# Patient Record
Sex: FEMALE | Race: WHITE | NOT HISPANIC OR LATINO | Employment: UNEMPLOYED | URBAN - METROPOLITAN AREA
[De-identification: names, ages, dates, MRNs, and addresses within clinical notes are randomized per-mention and may not be internally consistent; named-entity substitution may affect disease eponyms.]

---
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
## 2021-06-19 ENCOUNTER — HOSPITAL ENCOUNTER (EMERGENCY)
Facility: CLINIC | Age: 21
Discharge: HOME | End: 2021-06-20
Attending: EMERGENCY MEDICINE | Admitting: EMERGENCY MEDICINE
Payer: COMMERCIAL

## 2021-06-19 DIAGNOSIS — K08.89 PAIN, DENTAL: ICD-10-CM

## 2021-06-19 PROCEDURE — 99283 EMERGENCY DEPT VISIT LOW MDM: CPT | Mod: 25

## 2021-06-19 PROCEDURE — 64400 NJX AA&/STRD TRIGEMINAL NRV: CPT

## 2021-06-19 ASSESSMENT — MIFFLIN-ST. JEOR: SCORE: 1228.34

## 2021-06-20 VITALS
SYSTOLIC BLOOD PRESSURE: 129 MMHG | BODY MASS INDEX: 20.61 KG/M2 | DIASTOLIC BLOOD PRESSURE: 79 MMHG | OXYGEN SATURATION: 100 % | TEMPERATURE: 99.3 F | WEIGHT: 112 LBS | HEIGHT: 62 IN | HEART RATE: 78 BPM | RESPIRATION RATE: 18 BRPM

## 2021-06-20 PROCEDURE — 250N000013 HC RX MED GY IP 250 OP 250 PS 637: Performed by: EMERGENCY MEDICINE

## 2021-06-20 PROCEDURE — 250N000009 HC RX 250

## 2021-06-20 RX ORDER — BUPIVACAINE HYDROCHLORIDE AND EPINEPHRINE 5; 5 MG/ML; UG/ML
1.8 INJECTION, SOLUTION PERINEURAL ONCE
Status: COMPLETED | OUTPATIENT
Start: 2021-06-20 | End: 2021-06-20

## 2021-06-20 RX ORDER — BUPIVACAINE HYDROCHLORIDE AND EPINEPHRINE 5; 5 MG/ML; UG/ML
INJECTION, SOLUTION PERINEURAL
Status: COMPLETED
Start: 2021-06-20 | End: 2021-06-20

## 2021-06-20 RX ORDER — OXYCODONE HYDROCHLORIDE 5 MG/1
5 TABLET ORAL ONCE
Status: COMPLETED | OUTPATIENT
Start: 2021-06-20 | End: 2021-06-20

## 2021-06-20 RX ADMIN — OXYCODONE HYDROCHLORIDE 5 MG: 5 TABLET ORAL at 00:52

## 2021-06-20 RX ADMIN — BUPIVACAINE HYDROCHLORIDE AND EPINEPHRINE BITARTRATE 1.8 ML: 5; .005 INJECTION, SOLUTION SUBCUTANEOUS at 00:53

## 2021-06-20 RX ADMIN — BUPIVACAINE HYDROCHLORIDE AND EPINEPHRINE 1.8 ML: 5; 5 INJECTION, SOLUTION PERINEURAL at 00:53

## 2021-06-20 NOTE — ED TRIAGE NOTES
Pt having dental pain. States her right lower molar hurts and possible an upper right molar. Pt has had teeth problem in the past. Her pain started 2-3 days ago, states she started antibiotics 2 days ago. Pt also taking tylenol and ibuprofen with no relief. Pt A&Ox4

## 2021-06-20 NOTE — ED PROVIDER NOTES
"  History   Chief Complaint:  Dental Pain     HPI   David Osuna is a 20 year old female who presents with dental pain. For the past year and a half the patient has been dealing with intermittent severe pain in her back lower molar. She had her wisdom teeth removed which they thought would help but it did not provide any relief. Two to three days ago, the pain became severe, and she is unable to sleep due to this pain. She has tried ibuprofen and Tylenol which hasn't provided any relief. She states the pain has began radiating through the right side of her face. She notes that a small blister intromittently forms, and drains blood and pus, on her gums.     Review of Systems   HENT: Positive for dental problem.         Blister on gums   All other systems reviewed and are negative.    Allergies:  Tramadol    Medications:  Estradiol Cypionate  Norco  Amoxicillin    Past Medical History:    Anxiety  Recurrent major depressive disorder  ADHD  Oppositional defiant disorder  Tension headache    Past Surgical History:    Mass cyst abscess lymph node excision  [place] teeth    Family History:    Father: ADHD, Alcohol abuse, Drug abuse  Mother: Anxiety, Depression  Brother: ADHD, Anxiety, Depression  Mother: Asthma    Social History:  The patient was unaccompanied to the ED.    Physical Exam     Patient Vitals for the past 24 hrs:   BP Temp Temp src Pulse Resp SpO2 Height Weight   06/19/21 2245 124/88 99.3  F (37.4  C) Temporal 82 16 100 % 1.562 m (5' 1.5\") 50.8 kg (112 lb)       Physical Exam  SKIN:  Warm, dry.    HEMATOLOGIC/IMMUNOLOGIC/LYMPHATIC:  No cervical adenopathy.  HENT: No trismus.  No stridor.  Normal phonation.  No inflammation of the facial tissues.  No findings of oropharyngeal abscess.  EYES:  Conjunctivae normal.  CARDIOVASCULAR:  Regular rate and rhythm.  RESPIRATORY:  No respiratory distress, breath sounds equal and normal.  MUSCULOSKELETAL: Body habitus.  NEUROLOGIC:  Alert, conversant.  PSYCHIATRIC: " Anxious mood.    Emergency Department Course   Procedures    Dental Block      INDICATIONS: Dental pain    ANESTHESIA: Right inferior alveolar nerve block using 1.8mL 0.5% Bupivacaine with Epinephrine.     PATIENT STATUS: The patient tolerated the procedure well and there were no immediate complications.        Emergency Department Course:  Reviewed:  I reviewed nursing notes, vitals, past medical history and care everywhere    Assessments:  1548 I obtained history and examined the patient as noted above.     0018 I rechecked and updated the patient regarding the plan for care.    Interventions:  0052 Oxycodone 5mg PO    Disposition:  The patient was discharged to home.     Impression & Plan   Medical Decision Making:  David Osuna is a 20 year old female who presents with right facial/dental pain which has been an issue for some time. Just recently started Amoxicillin for concern of dental infection. No findings of oropharyngeal abscess on exam. No trismus. She has been taking Tylenol and ibuprofen with marginal relief. I performed an inferior alveolar nerve block which she thought was not helpful. She was given one dose of oxycodone, but I advised we do not prescribe opioids for dental pain. I provided an oral surgery follow up recommendation and otherwise encouraged follow up with her dentist of choice if need be.     Diagnosis:    ICD-10-CM    1. Pain, dental  K08.89      Scribe Disclosure:  I, Omar Dickerson, am serving as a scribe at 11:53 PM on 6/19/2021 to document services personally performed by Emanuel Vu MD based on my observations and the provider's statements to me.      Emanuel Vu MD  06/20/21 0254

## 2021-06-28 ENCOUNTER — APPOINTMENT (OUTPATIENT)
Dept: ULTRASOUND IMAGING | Facility: CLINIC | Age: 21
End: 2021-06-28
Attending: PHYSICIAN ASSISTANT
Payer: COMMERCIAL

## 2021-06-28 ENCOUNTER — HOSPITAL ENCOUNTER (EMERGENCY)
Facility: CLINIC | Age: 21
Discharge: HOME | End: 2021-06-28
Attending: PHYSICIAN ASSISTANT | Admitting: PHYSICIAN ASSISTANT
Payer: COMMERCIAL

## 2021-06-28 VITALS — TEMPERATURE: 98.4 F | HEART RATE: 123 BPM | OXYGEN SATURATION: 98 % | RESPIRATION RATE: 20 BRPM

## 2021-06-28 DIAGNOSIS — R55 SYNCOPE: ICD-10-CM

## 2021-06-28 DIAGNOSIS — N93.9 VAGINAL BLEEDING: ICD-10-CM

## 2021-06-28 LAB
ANION GAP SERPL CALCULATED.3IONS-SCNC: 6 MMOL/L (ref 3–14)
B-HCG FREE SERPL-ACNC: <5 IU/L
BASOPHILS # BLD AUTO: 0 10E9/L (ref 0–0.2)
BASOPHILS NFR BLD AUTO: 0.3 %
BUN SERPL-MCNC: 8 MG/DL (ref 7–30)
CALCIUM SERPL-MCNC: 9.8 MG/DL (ref 8.5–10.1)
CHLORIDE SERPL-SCNC: 105 MMOL/L (ref 96–110)
CO2 SERPL-SCNC: 26 MMOL/L (ref 20–32)
CREAT SERPL-MCNC: 0.76 MG/DL (ref 0.5–1)
DIFFERENTIAL METHOD BLD: NORMAL
EOSINOPHIL # BLD AUTO: 0.1 10E9/L (ref 0–0.7)
EOSINOPHIL NFR BLD AUTO: 0.7 %
ERYTHROCYTE [DISTWIDTH] IN BLOOD BY AUTOMATED COUNT: 11.7 % (ref 10–15)
GFR SERPL CREATININE-BSD FRML MDRD: >90 ML/MIN/{1.73_M2}
GLUCOSE SERPL-MCNC: 101 MG/DL (ref 70–99)
HCT VFR BLD AUTO: 39.3 % (ref 35–47)
HGB BLD-MCNC: 13.3 G/DL (ref 11.7–15.7)
IMM GRANULOCYTES # BLD: 0 10E9/L (ref 0–0.4)
IMM GRANULOCYTES NFR BLD: 0.3 %
INTERPRETATION ECG - MUSE: NORMAL
LYMPHOCYTES # BLD AUTO: 1.5 10E9/L (ref 0.8–5.3)
LYMPHOCYTES NFR BLD AUTO: 20 %
MCH RBC QN AUTO: 31.1 PG (ref 26.5–33)
MCHC RBC AUTO-ENTMCNC: 33.8 G/DL (ref 31.5–36.5)
MCV RBC AUTO: 92 FL (ref 78–100)
MONOCYTES # BLD AUTO: 0.4 10E9/L (ref 0–1.3)
MONOCYTES NFR BLD AUTO: 5.9 %
NEUTROPHILS # BLD AUTO: 5.5 10E9/L (ref 1.6–8.3)
NEUTROPHILS NFR BLD AUTO: 72.8 %
NRBC # BLD AUTO: 0 10*3/UL
NRBC BLD AUTO-RTO: 0 /100
PLATELET # BLD AUTO: 266 10E9/L (ref 150–450)
POTASSIUM SERPL-SCNC: 3.3 MMOL/L (ref 3.4–5.3)
RBC # BLD AUTO: 4.27 10E12/L (ref 3.8–5.2)
SODIUM SERPL-SCNC: 137 MMOL/L (ref 133–144)
SPECIMEN SOURCE: NORMAL
TROPONIN I SERPL-MCNC: <0.015 UG/L (ref 0–0.04)
WBC # BLD AUTO: 7.5 10E9/L (ref 4–11)
WET PREP SPEC: NORMAL

## 2021-06-28 PROCEDURE — 84484 ASSAY OF TROPONIN QUANT: CPT | Performed by: EMERGENCY MEDICINE

## 2021-06-28 PROCEDURE — 99285 EMERGENCY DEPT VISIT HI MDM: CPT | Mod: 25

## 2021-06-28 PROCEDURE — 80048 BASIC METABOLIC PNL TOTAL CA: CPT | Performed by: EMERGENCY MEDICINE

## 2021-06-28 PROCEDURE — 85025 COMPLETE CBC W/AUTO DIFF WBC: CPT | Performed by: EMERGENCY MEDICINE

## 2021-06-28 PROCEDURE — 84702 CHORIONIC GONADOTROPIN TEST: CPT

## 2021-06-28 PROCEDURE — 87210 SMEAR WET MOUNT SALINE/INK: CPT | Performed by: PHYSICIAN ASSISTANT

## 2021-06-28 PROCEDURE — 93005 ELECTROCARDIOGRAM TRACING: CPT

## 2021-06-28 PROCEDURE — 76830 TRANSVAGINAL US NON-OB: CPT

## 2021-06-28 RX ORDER — POTASSIUM CHLORIDE 1500 MG/1
40 TABLET, EXTENDED RELEASE ORAL ONCE
Status: DISCONTINUED | OUTPATIENT
Start: 2021-06-28 | End: 2021-06-28

## 2021-06-28 ASSESSMENT — ENCOUNTER SYMPTOMS
FEVER: 0
SHORTNESS OF BREATH: 0
HEADACHES: 0
DIARRHEA: 1
CHILLS: 0
NAUSEA: 1
SORE THROAT: 0
FATIGUE: 1

## 2021-06-28 NOTE — ED TRIAGE NOTES
Patient reports intermittent spotting for the past 2 days. States might be pregnant. Took home pregnancy middle of June. States fainted last night.

## 2021-06-29 NOTE — ED PROVIDER NOTES
History   Chief Complaint:  Vaginal Bleeding     The history is provided by the patient.      David Osuna is a 20 year old female with history of ADHD, anxiety, depressive disorder, and abscess excision who presents with vaginal bleeding. The patient states that for the past 2 weeks she has been spotting intermittently (which is abnormal from baseline as her menstrual cycle is usually regular), and notes that she also fainted unexpectedly last night. Here in the ED she endorses diarrhea last week which has since resolved, nausea yesterday, as well as concerns for pregnancy. She denies fever, chills, shortness of breath, chest pain, abdominal pain, vaginal discharge, dysuria, urinary urgency, urinary frequency,  headache, or sore throat. Of note her last menstrual cycle was approximately one month ago on 5/21 and she is not on any birth control. She has an appointment scheduled with her PCP in two days. Of note the patient recently finished a course of Amoxicillin for dental carries, with the last dose being 10 days ago on 6/18/21.     I asked the patient if she feels safe in her current relationship, to which she said yes and further denied any concern for STD's.     Review of Systems   Constitutional: Positive for fatigue. Negative for chills and fever.   HENT: Negative for sore throat.    Respiratory: Negative for shortness of breath.    Cardiovascular: Negative for chest pain.   Gastrointestinal: Positive for diarrhea (Resolved ) and nausea (Yesterday).   Genitourinary: Positive for vaginal bleeding.   Neurological: Positive for syncope (Yesterday). Negative for headaches.   All other systems reviewed and are negative.    Allergies:  Tramadol    Medications:  Depo-Estradiol  Cymbalta  Colace  Albuterol  Zyrtec  Flonase  Singulair  Micronor  Zofran    Past Medical History:    Severe Episodic Depressive Disorder  Anxiety  ADHD  Oppositional Defiant Disorder  Tension Headache  Nexplanon in Place  TMJ  Tenderness  Compression Fracture of T12  Seasonal Allergies  Acute Cystitis without Hematuria  Dental Caries    Past Surgical History:    Mass Cyst Abscess Node Excision    Family History:    Father - ADHD, Alcohol Abuse, Drug Abuse  Mother - Anxiety, Depression  Brother - ADHD, Anxiety, Depression    Social History:  Patient arrives alone, boyfriend arrived later on  Marital Status: Single    Physical Exam     Patient Vitals for the past 24 hrs:   Temp Temp src Pulse Resp SpO2   06/28/21 1624 98.4  F (36.9  C) Temporal 123 20 98 %       Physical Exam  Vitals signs and nursing note reviewed.   HENT:      Nose: Nose normal. No congestion or rhinorrhea.      Mouth/Throat:      Mouth: Mucous membranes are moist.      Pharynx: Oropharynx is clear. No oropharyngeal exudate or posterior oropharyngeal erythema.   Eyes:      General: No scleral icterus.     Extraocular Movements: Extraocular movements intact.      Conjunctiva/sclera: Conjunctivae normal.      Pupils: Pupils are equal, round, and reactive to light.   Cardiovascular:      Rate and Rhythm: Regular rhythm. Normal Rate.     Pulses: Normal pulses.      Heart sounds: Normal heart sounds.   Pulmonary:      Effort: Pulmonary effort is normal.      Breath sounds: Normal breath sounds.   Abdominal:      General: Abdomen is flat. Bowel sounds are normal.      Palpations: Abdomen is soft.      Tenderness: There is no abdominal tenderness.  exam: Chaperone present [employer]. Normal external genitalia.  No lesions or open areas.  Cervix was visualized and appeared normal.  Minimal blood noted in the vaginal canal.   Musculoskeletal: Normal range of motion.      Right lower leg: No edema.      Left lower leg: No edema.   Skin:     General: Skin is warm and dry.  No lesions or open areas on exposed skin.  Neurological:      Mental Status: Alert. Speech normal. Responds appropriately to questions.   Psychiatric:         Mood and Affect: Mood normal.         Behavior:  Behavior normal.     Emergency Department Course          EKG Interpretation:    Sinus rhythm  Normal EKG  NJ= 156 ms  QRS - 84 ms   QTc= 358/412  P R T axes 65 80 52    Imaging:  US Pelvic Complete w Transvaginal   Normal pelvic ultrasound.   Reading per radiology    Laboratory:  CBC: WBC 7.5, HGB 13.3,    BMP: Glucose 101 (H), Potassium 3.3 (L), o/w WNL (Creatinine 0.76)     Troponin: (Collected 1635) <0.015    ISTAT HCG Quantitative: <5.0    Wet Prep: No trichomonas, yeast, or clue cells seen.    Emergency Department Course:    Reviewed:  I reviewed nursing notes, vitals, past medical history and care everywhere    Assessments:  1940 I obtained history and examined the patient as noted above.   2005 I rechecked the patient with a chaperone.  2027 Patient rechecked and updated with chaperones.  2110 Pelvic US ordered, given reported symptoms patient deferred.  2111 Patient discharged.  2125 I was updated by care staff that the patient did ultimately decide to go to ultrasound.    Disposition:  The patient left AMA before ultrasound results were complete.    Impression & Plan   Medical Decision Making:  Alize Ames is a 24 year old female who presents for evaluation of painless vaginal bleeding.  The differential diagnosis of vaginal bleeding is broad and includes uterine leiomyomas, endometrial polyp, adenomyosis, primary dysmenorrhea among others. More rare but serious etiologies considered included ectopic pregnancy, pregnancy, heterotopic pregnancy, etc. labs were obtained.  Pregnancy test negative.  Wet prep negative.  CBC demonstrated no evidence of leukocytosis.  Hemoglobin stable at 13.3.  BMP showed a potassium of 3.3 otherwise unremarkable.  Patient declined STD testing.  Given report of a syncopal episode yesterday, an EKG was obtained which showed normal sinus rhythm without evidence of acute ischemia.  Troponin negative.  Patient denied striking her head, confusion, weakness, vision  changes, and had a normal neurological exam therefore advanced cranial imaging was deferred at this time.  While the etiology of her syncopal episode yesterday is unclear, I have low clinical suspicion for a catastrophic process. While in the ED, a pelvic ultrasound was ordered and the patient went and had the ultrasound completed.  Unfortunately, upon returning to her room she stated that she would like to be discharged home immediately as she had to be back to her house by a specific time.  I explained to the patient the importance of remaining in the emergency department until her results come back in the event that there is a catastrophic process that needs emergent intervention.  The patient insisted that she must leave therefore I requested that she sign AMA papers.  Fortunately, the ultrasound did return and there were no acute findings noted.  Prior to leaving the emergency department, the patient was encouraged to follow-up with her primary care provider/OB/GYN tomorrow for reassessment.  Strict return precautions were discussed.  All questions and concerns were addressed prior to discharge.    Covid-19  David Osuna was evaluated during a global COVID-19 pandemic, which necessitated consideration that the patient might be at risk for infection with the SARS-CoV-2 virus that causes COVID-19.   Applicable protocols for evaluation were followed during the patient's care.   COVID-19 was considered as part of the patient's evaluation.     Diagnosis:    ICD-10-CM    1. Vaginal bleeding  N93.9    2. Syncope  R55      Scribe Disclosure:  Eileen JEFFERSON, am serving as a scribe at 7:03 PM on 6/28/2021 to document services personally performed by Aishwarya Renteria PA-C based on my observations and the provider's statements to me.     This note was completed in part using Dragon voice recognition software. Although reviewed after completion, some word and grammatical errors may occur.     Aishwarya Renteria  MENDOZA  06/28/21 2991       Aishwarya Renteria PA-C  06/28/21 [id number]     DISPLAY PLAN FREE TEXT

## 2021-06-29 NOTE — DISCHARGE INSTRUCTIONS
Discussed, your pregnancy test was negative.  Your remaining lab work was reassuring.  Please follow-up with your primary care provider/OB/GYN in 1 day for reassessment.  Return to the emergency department as needed.

## 2021-07-31 ENCOUNTER — HEALTH MAINTENANCE LETTER (OUTPATIENT)
Age: 21
End: 2021-07-31

## 2021-09-25 ENCOUNTER — HEALTH MAINTENANCE LETTER (OUTPATIENT)
Age: 21
End: 2021-09-25

## 2022-07-05 ENCOUNTER — HOSPITAL ENCOUNTER (EMERGENCY)
Facility: CLINIC | Age: 22
Discharge: HOME | End: 2022-07-05
Payer: COMMERCIAL

## 2022-07-05 VITALS
OXYGEN SATURATION: 100 % | SYSTOLIC BLOOD PRESSURE: 104 MMHG | HEIGHT: 61 IN | BODY MASS INDEX: 26.62 KG/M2 | RESPIRATION RATE: 14 BRPM | TEMPERATURE: 97.3 F | HEART RATE: 74 BPM | WEIGHT: 141 LBS | DIASTOLIC BLOOD PRESSURE: 69 MMHG

## 2022-07-05 NOTE — ED TRIAGE NOTES
Pt reports running into a door knob with her L pelvic/abdomen area. Pt notes being 15 weeks pregnant.      Triage Assessment     Row Name 07/05/22 0565       Triage Assessment (Adult)    Airway WDL WDL       Respiratory WDL    Respiratory WDL WDL       Skin Circulation/Temperature WDL    Skin Circulation/Temperature WDL WDL       Cardiac WDL    Cardiac WDL WDL       Peripheral/Neurovascular WDL    Peripheral Neurovascular WDL WDL       Cognitive/Neuro/Behavioral WDL    Cognitive/Neuro/Behavioral WDL WDL

## 2022-08-27 ENCOUNTER — HEALTH MAINTENANCE LETTER (OUTPATIENT)
Age: 22
End: 2022-08-27

## 2023-01-07 ENCOUNTER — HEALTH MAINTENANCE LETTER (OUTPATIENT)
Age: 23
End: 2023-01-07

## 2023-09-23 ENCOUNTER — HEALTH MAINTENANCE LETTER (OUTPATIENT)
Age: 23
End: 2023-09-23

## 2024-11-10 ENCOUNTER — HEALTH MAINTENANCE LETTER (OUTPATIENT)
Age: 24
End: 2024-11-10